# Patient Record
Sex: MALE | Race: BLACK OR AFRICAN AMERICAN | URBAN - METROPOLITAN AREA
[De-identification: names, ages, dates, MRNs, and addresses within clinical notes are randomized per-mention and may not be internally consistent; named-entity substitution may affect disease eponyms.]

---

## 2022-04-21 ENCOUNTER — OFFICE VISIT (OUTPATIENT)
Dept: URBAN - METROPOLITAN AREA CLINIC 74 | Facility: CLINIC | Age: 82
End: 2022-04-21

## 2022-05-19 ENCOUNTER — LAB OUTSIDE AN ENCOUNTER (OUTPATIENT)
Dept: URBAN - METROPOLITAN AREA CLINIC 74 | Facility: CLINIC | Age: 82
End: 2022-05-19

## 2022-05-19 ENCOUNTER — OFFICE VISIT (OUTPATIENT)
Dept: URBAN - METROPOLITAN AREA CLINIC 74 | Facility: CLINIC | Age: 82
End: 2022-05-19
Payer: MEDICARE

## 2022-05-19 ENCOUNTER — WEB ENCOUNTER (OUTPATIENT)
Dept: URBAN - METROPOLITAN AREA CLINIC 74 | Facility: CLINIC | Age: 82
End: 2022-05-19

## 2022-05-19 ENCOUNTER — DASHBOARD ENCOUNTERS (OUTPATIENT)
Age: 82
End: 2022-05-19

## 2022-05-19 VITALS
HEIGHT: 67 IN | DIASTOLIC BLOOD PRESSURE: 84 MMHG | TEMPERATURE: 98.1 F | WEIGHT: 146 LBS | BODY MASS INDEX: 22.91 KG/M2 | SYSTOLIC BLOOD PRESSURE: 132 MMHG | HEART RATE: 61 BPM

## 2022-05-19 DIAGNOSIS — I27.20 PULMONARY HYPERTENSION: ICD-10-CM

## 2022-05-19 DIAGNOSIS — Z83.71 FAMILY HISTORY OF COLONIC POLYPS: ICD-10-CM

## 2022-05-19 DIAGNOSIS — C61 PROSTATE CA: ICD-10-CM

## 2022-05-19 DIAGNOSIS — D12.3 ADENOMATOUS POLYP OF TRANSVERSE COLON: ICD-10-CM

## 2022-05-19 DIAGNOSIS — D12.2 ADENOMATOUS POLYP OF ASCENDING COLON: ICD-10-CM

## 2022-05-19 DIAGNOSIS — Z86.010 PERSONAL HISTORY OF COLONIC POLYPS: ICD-10-CM

## 2022-05-19 DIAGNOSIS — I25.810 CAD (CORONARY ARTERY DISEASE) OF ARTERY BYPASS GRAFT: ICD-10-CM

## 2022-05-19 DIAGNOSIS — N28.9 RENAL DYSFUNCTION: ICD-10-CM

## 2022-05-19 DIAGNOSIS — D50.8 OTHER IRON DEFICIENCY ANEMIAS: ICD-10-CM

## 2022-05-19 DIAGNOSIS — K31.89 INTESTINAL METAPLASIA OF GASTRIC MUCOSA: ICD-10-CM

## 2022-05-19 DIAGNOSIS — K44.9 HIATAL HERNIA: ICD-10-CM

## 2022-05-19 PROBLEM — 429969003: Status: ACTIVE | Noted: 2022-05-19

## 2022-05-19 PROCEDURE — 99213 OFFICE O/P EST LOW 20 MIN: CPT | Performed by: INTERNAL MEDICINE

## 2022-05-19 RX ORDER — POLYETHYLENE GLYOCOL 3350, SODIUM CHLORIDE, SODIUM BICARBONATE AND POTASSIUM CHLORIDE 420; 11.2; 5.72; 1.48 G/4L; G/4L; G/4L; G/4L
AS DIRECTED POWDER, FOR SOLUTION NASOGASTRIC; ORAL
Qty: 1 | Refills: 0 | OUTPATIENT
Start: 2022-05-19 | End: 2022-05-20

## 2022-05-19 RX ORDER — NITROGLYCERIN 0.4 MG/1
PLACE 1 TABLET (0.4 MG) BY BUCCAL ROUTE AT THE FIRST SIGN OF AN ATTACK; NO MORE THAN 3 TABS ARE RECOMMENDED WITHIN A 15 MINUTE PERIOD TABLET SUBLINGUAL
Qty: 1 | Refills: 0 | Status: ACTIVE | COMMUNITY
Start: 1900-01-01

## 2022-05-19 RX ORDER — HYDRALAZINE HYDROCHLORIDE 100 MG/1
TAKE 1 TABLET (100 MG) BY ORAL ROUTE 2 TIMES PER DAY WITH FOOD TABLET ORAL 2
Qty: 0 | Refills: 0 | Status: ACTIVE | COMMUNITY
Start: 1900-01-01

## 2022-05-19 RX ORDER — HYDROCHLOROTHIAZIDE 25 MG/1
TAKE 1 TABLET (25 MG) BY ORAL ROUTE ONCE DAILY TABLET ORAL 1
Qty: 0 | Refills: 0 | Status: ACTIVE | COMMUNITY
Start: 1900-01-01

## 2022-05-19 RX ORDER — AMLODIPINE BESYLATE 10 MG/1
TAKE 1 TABLET (10 MG) BY ORAL ROUTE ONCE DAILY TABLET ORAL 1
Qty: 0 | Refills: 0 | Status: ACTIVE | COMMUNITY
Start: 1900-01-01

## 2022-05-19 RX ORDER — CYANOCOBALAMIN 1000 UG/ML
INJECT 0.05 MILLILITER (50 MCG) BY INTRAMUSCULAR ROUTE ONCE A MONTH INJECTION INTRAMUSCULAR; SUBCUTANEOUS
Qty: 1 | Refills: 0 | Status: ACTIVE | COMMUNITY
Start: 1900-01-01

## 2022-05-19 RX ORDER — APIXABAN 5 MG/1
TAKE 1 TABLET (5 MG) BY ORAL ROUTE 2 TIMES PER DAY TABLET, FILM COATED ORAL 2
Qty: 0 | Refills: 0 | Status: ACTIVE | COMMUNITY
Start: 1900-01-01

## 2022-05-19 RX ORDER — SIMVASTATIN 40 MG/1
TAKE 1 TABLET (40 MG) BY ORAL ROUTE ONCE DAILY IN THE EVENING TABLET, FILM COATED ORAL 1
Qty: 0 | Refills: 0 | Status: ACTIVE | COMMUNITY
Start: 1900-01-01

## 2022-05-19 NOTE — HPI-TODAY'S VISIT:
Today May 19, 2022 the patient returns for a follow-up visit, the patient was last seen in the office on January 30, 2021 with iron deficient anemia due to chronic blood loss, positive stools for occult blood, atrophic gastritis, gastric intestinal metaplasia, hiatal hernia, personal history of colonic polyps, coronary artery disease with stents on chronic anticoagulation, pulmonary hypertension, renal dysfunction, carotid stenosis.  At the time of the last visit the patient returned to the office stating that he would rather not have a small bowel capsule endoscopy, the patient was taking iron and had green formed BMs, denied having any GI symptoms.  The patient decided to return to primary care and follow-up with us as needed.  The patient had in 2019 and EGD that showed normal esophageal mucosa, atrophic gastritis with intestinal metaplasia and no dysplasia, small hiatal hernia with normal duodenal mucosa.  The  patient had a colonoscopy which showed internal hemorrhoids, a 4 mm cecal adenomatous polyp, and a 3 mm transverse colon polyp.  At the time the patient was advised to get a colonoscopy in 2024.  At the time the patient had a negative Hemosure and his H&H was 10.3 and 33.8.  The patient was recently referred back to us for a colonoscopy.  Review of records showed that the patient was seen by primary care and among  other diagnoses he also had a previous TIA. Today the patient returns to the office in the company of his daughter stating that he was recommended by primary care to have a follow-up surveillance colonoscopy.  The patient remains on iron supplements by mouth and B12 for anemia.  Review of records showed that the patient had a negative stool for occult blood in November 2021.  Currently the patient denies having any dysphagia, odynophagia, nausea, vomiting, heartburn.  The patient takes famotidine for acid reflux.  The patient claims having normal bowel movements, denies having any diarrhea, constipation, rectal bleeding, fecal urgency, hematochezia, bloating or cramping abdominal pain.  's family history significant for a daughter with colonic polyps.  The patient will be scheduled to have a surveillance colonoscopy.  Benefits potential complications and alternatives to colonoscopy were disclosed. The patient's heart condition appears to be under control, he denies having any shortness of breath and is able to climb up to 16 steps without any shortness of breath or chest discomfort.  We will obtain cardiac clearance to hold anticoagulation before the procedure.

## 2022-09-16 ENCOUNTER — TELEPHONE ENCOUNTER (OUTPATIENT)
Dept: URBAN - METROPOLITAN AREA CLINIC 74 | Facility: CLINIC | Age: 82
End: 2022-09-16

## 2024-05-31 ENCOUNTER — APPOINTMENT (RX ONLY)
Dept: URBAN - METROPOLITAN AREA OTHER 5 | Facility: OTHER | Age: 84
Setting detail: DERMATOLOGY
End: 2024-05-31

## 2024-05-31 DIAGNOSIS — L82.1 OTHER SEBORRHEIC KERATOSIS: ICD-10-CM

## 2024-05-31 PROCEDURE — ? COUNSELING

## 2024-05-31 PROCEDURE — 99202 OFFICE O/P NEW SF 15 MIN: CPT

## 2024-05-31 PROCEDURE — ? ADDITIONAL NOTES

## 2024-05-31 ASSESSMENT — LOCATION SIMPLE DESCRIPTION DERM: LOCATION SIMPLE: ABDOMEN

## 2024-05-31 ASSESSMENT — LOCATION DETAILED DESCRIPTION DERM: LOCATION DETAILED: LEFT LATERAL ABDOMEN

## 2024-05-31 ASSESSMENT — LOCATION ZONE DERM: LOCATION ZONE: TRUNK

## 2024-05-31 NOTE — PROCEDURE: ADDITIONAL NOTES
Detail Level: Zone
Additional Notes:  educated patient that this lesion is harmless and more lesions of this kind will appear over time.
Render Risk Assessment In Note?: no

## 2024-05-31 NOTE — HPI: SKIN LESION
What Type Of Note Output Would You Prefer (Optional)?: Standard Output
How Severe Is Your Skin Lesion?: moderate
Has Your Skin Lesion Been Treated?: not been treated
Is This A New Presentation, Or A Follow-Up?: Mole
Additional History: PCP was concerned about shape of mole.